# Patient Record
Sex: MALE
[De-identification: names, ages, dates, MRNs, and addresses within clinical notes are randomized per-mention and may not be internally consistent; named-entity substitution may affect disease eponyms.]

---

## 2024-09-23 PROBLEM — Z00.00 ENCOUNTER FOR PREVENTIVE HEALTH EXAMINATION: Status: ACTIVE | Noted: 2024-09-23

## 2024-09-24 ENCOUNTER — NON-APPOINTMENT (OUTPATIENT)
Age: 69
End: 2024-09-24

## 2024-09-24 DIAGNOSIS — Z78.9 OTHER SPECIFIED HEALTH STATUS: ICD-10-CM

## 2024-09-24 DIAGNOSIS — Z87.891 PERSONAL HISTORY OF NICOTINE DEPENDENCE: ICD-10-CM

## 2024-09-24 RX ORDER — CARVEDILOL 25 MG/1
25 TABLET, FILM COATED ORAL
Refills: 0 | Status: ACTIVE | COMMUNITY

## 2024-09-24 RX ORDER — ATORVASTATIN CALCIUM 10 MG/1
10 TABLET, FILM COATED ORAL
Refills: 0 | Status: ACTIVE | COMMUNITY

## 2024-09-24 RX ORDER — ALLOPURINOL 100 MG/1
100 TABLET ORAL
Refills: 0 | Status: ACTIVE | COMMUNITY

## 2024-09-24 RX ORDER — CLOPIDOGREL BISULFATE 75 MG/1
75 TABLET, FILM COATED ORAL
Refills: 0 | Status: ACTIVE | COMMUNITY

## 2024-09-24 RX ORDER — HUMAN INSULIN 100 [IU]/ML
(70-30) 100 INJECTION, SUSPENSION SUBCUTANEOUS
Refills: 0 | Status: ACTIVE | COMMUNITY

## 2024-09-24 RX ORDER — ASPIRIN 81 MG
81 TABLET, DELAYED RELEASE (ENTERIC COATED) ORAL
Refills: 0 | Status: ACTIVE | COMMUNITY

## 2024-09-24 RX ORDER — DULAGLUTIDE 0.75 MG/.5ML
0.75 INJECTION, SOLUTION SUBCUTANEOUS
Refills: 0 | Status: ACTIVE | COMMUNITY

## 2024-09-24 RX ORDER — NIZATIDINE 150 MG/1
150 CAPSULE ORAL
Refills: 0 | Status: ACTIVE | COMMUNITY

## 2024-09-25 ENCOUNTER — APPOINTMENT (OUTPATIENT)
Age: 69
End: 2024-09-25
Payer: MEDICARE

## 2024-09-25 VITALS
WEIGHT: 230 LBS | HEART RATE: 53 BPM | HEIGHT: 69 IN | BODY MASS INDEX: 34.07 KG/M2 | SYSTOLIC BLOOD PRESSURE: 119 MMHG | DIASTOLIC BLOOD PRESSURE: 77 MMHG

## 2024-09-25 DIAGNOSIS — M10.9 GOUT, UNSPECIFIED: ICD-10-CM

## 2024-09-25 DIAGNOSIS — E11.9 TYPE 2 DIABETES MELLITUS W/OUT COMPLICATIONS: ICD-10-CM

## 2024-09-25 DIAGNOSIS — E13.22 OTHER SPECIFIED DIABETES MELLITUS WITH DIABETIC CHRONIC KIDNEY DISEASE: ICD-10-CM

## 2024-09-25 DIAGNOSIS — N18.30 OTHER SPECIFIED DIABETES MELLITUS WITH DIABETIC CHRONIC KIDNEY DISEASE: ICD-10-CM

## 2024-09-25 DIAGNOSIS — E78.5 HYPERLIPIDEMIA, UNSPECIFIED: ICD-10-CM

## 2024-09-25 PROCEDURE — 99204 OFFICE O/P NEW MOD 45 MIN: CPT

## 2024-09-25 RX ORDER — LISINOPRIL 20 MG/1
20 TABLET ORAL
Refills: 0 | Status: DISCONTINUED | COMMUNITY
End: 2024-09-25

## 2024-09-25 RX ORDER — DAPAGLIFLOZIN 10 MG/1
10 TABLET, FILM COATED ORAL DAILY
Qty: 90 | Refills: 1 | Status: ACTIVE | COMMUNITY
Start: 2024-09-25 | End: 1900-01-01

## 2024-09-25 NOTE — PHYSICAL EXAM
[TextEntry] : GA NAD HEENT normal  Skin color normal  CV S1S2 normal no peripheral edema  Respiratory breath sounds normal  GI abdomen soft, non-tender MSK no deformities Neuro A and Ox3

## 2024-09-25 NOTE — SOCIAL HISTORY
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

## 2024-09-25 NOTE — PLAN
[TextEntry] : Can decrease carvedilol to 12.5 mg bid  Stop lisinopril  Start farxiga 10 mg daily  Repeat labs in 1 month after farxiga is initiated  Follow up in 4 months with repeat results prior  Discussed with the patient and his wife

## 2024-09-25 NOTE — ASSESSMENT
[FreeTextEntry1] : CKD stage 3b, renal failure is currently progressing  No signficant proteinuria, and us renal and bladder is unremarkable  Duplex of renal arteries is unremarkable as well  HyperK has resolved  HTN - BP is currently controlled with carvedilol 25 mg bid  Sinus bradycardia could be due to beta-blockers

## 2024-09-25 NOTE — FAMILY HISTORY
[TextEntry] : Unknwokwn whether anyone in the family has CKD  Does not really know much about his family

## 2024-09-25 NOTE — HISTORY OF PRESENT ILLNESS
[___ Month(s) Ago] : [unfilled] month(s) ago [Stage 3] : stage 3 [Diabetic Nephropathy] : diabetic nephropathy [Antihypertensives] : antihypertensives [Good Compliance] : good compliance  [FreeTextEntry1] : 67 yo M with history of DM2 for 30-40 years, CAD s/p PCI with stents, HTN, gout was referred for evaluation of CKD.   Patient has been having CKD since 2018, but his renal function has been worsening in July, and he was noted to have hyperK as well. He attributes worsening renal function due to trulicity and lisinopril, so both were stopped. His repeat K has improved after lisinopril was stopped. Patient was taking ibuprofen daily for 2 weeks back in June for worsening toothache, but he is not taking it anymore.   Patient was seen by Nephrologist in [place] before and was told to drink more water.   9/25/2024 Seen today  Denies any complaints Tolerating medications  Currently off RAASi

## 2024-09-25 NOTE — RESULTS/DATA
[TextEntry] : 9/21/2024 Uric acid 6.6 glucose 120 BUN/Cr 48/2.25 eGFR 31 K 5.4 HCO3 23 Hb 15.9 7/29/2024 BUN/Cr 47/2.1 K 5.6 HCO3 20 Ca 9.7 eGFR 33 7/18/2024 BUN/Cr 53/2.09 HCO3 18 K 5.9 HbA1c 7.2 UA 1+ LE, 6-10 WBCs  ACR 0.020 2/1/2023 BUN?Cr 35/1.92 L 4.9  2/7/2022 BUN/Cr 29/1.75  Imaging  us renal and bladder 8/2024 Normal renal sonogram with normal arterial Doppler evaluation. 1. Fatty infiltration of the liver. 2. Obscuration of the pancreas by overlying bowel gas, otherwise unremarkable examination. 3. Post void bladder residual volume of 86 mL. RIGHT KIDNEY: 10.7 cm x 6.0 cm x 4.3 cm. There is no hydronephrosis, solid renal mass, or calculus. LEFT KIDNEY: 10.0 cm x 4.3 cm x 4.9 cm. There is no hydronephrosis, solid renal mass, or calculus.

## 2024-09-25 NOTE — REVIEW OF SYSTEMS
[TextEntry] : ROS  General: Denies unintentional weight loss Ears/Nose/Throat: Denies sinusitis Cardiovascular: Denies chest pain, dyspnea Respiratory: Denies cough, hemoptysis Gastrointestinal: Denies nausea, vomiting, diarrhea Genitourinary:: Denies hematuria, dysuria Musculoskeletal: Denies joint pain, myalgias Skin: Denies rash, pruritis Neurologic: Denies headaches, blurry vision Endocrine: Denies polydipsia, polyuria